# Patient Record
Sex: MALE | NOT HISPANIC OR LATINO | URBAN - METROPOLITAN AREA
[De-identification: names, ages, dates, MRNs, and addresses within clinical notes are randomized per-mention and may not be internally consistent; named-entity substitution may affect disease eponyms.]

---

## 2017-10-27 ENCOUNTER — EMERGENCY (EMERGENCY)
Facility: HOSPITAL | Age: 27
LOS: 1 days | Discharge: HOME | End: 2017-10-27
Admitting: EMERGENCY MEDICINE
Payer: SELF-PAY

## 2017-10-27 VITALS
TEMPERATURE: 97 F | HEART RATE: 90 BPM | DIASTOLIC BLOOD PRESSURE: 73 MMHG | OXYGEN SATURATION: 99 % | SYSTOLIC BLOOD PRESSURE: 133 MMHG | RESPIRATION RATE: 18 BRPM

## 2017-10-27 DIAGNOSIS — H57.8 OTHER SPECIFIED DISORDERS OF EYE AND ADNEXA: ICD-10-CM

## 2017-10-27 DIAGNOSIS — H11.001 UNSPECIFIED PTERYGIUM OF RIGHT EYE: ICD-10-CM

## 2017-10-27 PROBLEM — Z00.00 ENCOUNTER FOR PREVENTIVE HEALTH EXAMINATION: Status: ACTIVE | Noted: 2017-10-27

## 2017-10-27 PROCEDURE — 99282 EMERGENCY DEPT VISIT SF MDM: CPT

## 2017-10-27 PROCEDURE — 99282 EMERGENCY DEPT VISIT SF MDM: CPT | Mod: 25

## 2017-10-27 PROCEDURE — 99053 MED SERV 10PM-8AM 24 HR FAC: CPT

## 2017-10-27 NOTE — ED PROVIDER NOTE - OBJECTIVE STATEMENT
noted spot on OD ~ 1 week non tender no visual changes + works with a lot of dust and often irritated eyes above and beyond this new finding

## 2017-10-27 NOTE — ED PROVIDER NOTE - MEDICAL DECISION MAKING DETAILS
counseled on occular care considering work eexposure as well as Optho follow up to confirm pterygium new finding counseled on occular care considering work exposure as well as Optho follow up to confirm pterygium new finding

## 2017-10-27 NOTE — ED ADULT NURSE NOTE - OBJECTIVE STATEMENT
26 y/o male with no significant medical hx arrived to [facility] ER reporting right eye pain for the past week. Upon assessment, right eye redness noted with slight clear drainage and slight inflammation noted. Pt denies blurred vision, slurred speech, fever, chills, nausea, vomiting, diarrhea, chest pain, headache, recent travel, recent injury, weakness, fatigue, and palpitations. 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 Care in progress. Awaiting disposition

## 2017-10-27 NOTE — ED PROVIDER NOTE - PHYSICAL EXAMINATION
+ wears glasses noted + scleral injection noted bilaterally EOMI PERRLA +  likely early Pterygium noted OD medial aspect + wears glasses noted + scleral injection noted bilaterally EOMI PERRLA +  likely early Pterygium noted OD medial aspect OD/OS 20/20 corrected NO FB noted on SLE

## 2017-10-27 NOTE — ED ADULT TRIAGE NOTE - CHIEF COMPLAINT QUOTE
pt states " I noticed a white spot in my Right eye which developed about a week ago " pt denies any injury to area , redness noted  , no visual changes

## 2017-11-20 ENCOUNTER — APPOINTMENT (OUTPATIENT)
Dept: OPHTHALMOLOGY | Facility: CLINIC | Age: 27
End: 2017-11-20

## 2022-07-08 NOTE — ED PROVIDER NOTE - SKIN, MLM
No significant interval updates.     Lizette Pal MD MS
Skin normal color for race, warm, dry and intact. No evidence of rash.

## 2025-04-01 NOTE — ED PROVIDER NOTE - CPE EDP PSYCH NORM
Thank you for letting us take care of you today. We hope all your questions were addressed. If a question was overlooked or something else comes to mind after you return home, please contact a member of your Care Team listed below.      Your Care Team at [facility] is Team #2  Fede Solis M.D. (Faculty)  Jacqueline Riggs M.D. (Resident)  Nayeli Taylor M.D. (Resident)  Sandy Edmond M.D. (Resident)  Mere Case M.D. (Resident)  Dixie Platt M.D. (Resident)  Tramaine Morel, [facility]  Abril Barrera, [facility]  Debi Campuzano,  [facility]  Rachelle Huertas, [facility]  Yamile Villasenor, [facility]  Patti Lopez, [facility]  Fiordaliza York (LJ) Jayda ANDREW (Clinical Practice Manager)  Kaleigh Ryder [facility] (Clinical Pharmacist)     Office phone number: [phone]    If you need to get in right away due to illness, please be advised we have \"Same Day\" appointments available Monday-Friday. Please call us at [phone] option #3 to schedule your \"Same Day\" appointment.    normal...